# Patient Record
Sex: MALE | Race: WHITE | NOT HISPANIC OR LATINO | Employment: UNEMPLOYED | URBAN - METROPOLITAN AREA
[De-identification: names, ages, dates, MRNs, and addresses within clinical notes are randomized per-mention and may not be internally consistent; named-entity substitution may affect disease eponyms.]

---

## 2017-01-13 ENCOUNTER — OFFICE VISIT (OUTPATIENT)
Dept: PULMONOLOGY | Facility: CLINIC | Age: 57
End: 2017-01-13
Payer: MEDICAID

## 2017-01-13 VITALS
HEART RATE: 137 BPM | SYSTOLIC BLOOD PRESSURE: 135 MMHG | BODY MASS INDEX: 21.9 KG/M2 | DIASTOLIC BLOOD PRESSURE: 85 MMHG | OXYGEN SATURATION: 93 % | HEIGHT: 66 IN | WEIGHT: 136.25 LBS

## 2017-01-13 DIAGNOSIS — J44.1 COPD EXACERBATION: Primary | ICD-10-CM

## 2017-01-13 DIAGNOSIS — S29.9XXD CHEST WALL INJURY, SUBSEQUENT ENCOUNTER: ICD-10-CM

## 2017-01-13 PROBLEM — S29.9XXA CHEST WALL INJURY: Status: ACTIVE | Noted: 2017-01-13

## 2017-01-13 PROCEDURE — 99213 OFFICE O/P EST LOW 20 MIN: CPT | Mod: S$PBB,,, | Performed by: INTERNAL MEDICINE

## 2017-01-13 PROCEDURE — 99214 OFFICE O/P EST MOD 30 MIN: CPT | Mod: PBBFAC,PO | Performed by: INTERNAL MEDICINE

## 2017-01-13 PROCEDURE — 99999 PR PBB SHADOW E&M-EST. PATIENT-LVL IV: CPT | Mod: PBBFAC,,, | Performed by: INTERNAL MEDICINE

## 2017-01-13 RX ORDER — AMOXICILLIN AND CLAVULANATE POTASSIUM 875; 125 MG/1; MG/1
TABLET, FILM COATED ORAL
Refills: 3 | COMMUNITY
Start: 2016-12-30

## 2017-01-13 RX ORDER — ATORVASTATIN CALCIUM 40 MG/1
TABLET, FILM COATED ORAL
Refills: 6 | COMMUNITY
Start: 2016-12-03

## 2017-01-13 RX ORDER — HYDROCODONE BITARTRATE AND ACETAMINOPHEN 10; 325 MG/1; MG/1
TABLET ORAL
Refills: 0 | COMMUNITY
Start: 2016-12-12

## 2017-01-13 RX ORDER — ESCITALOPRAM OXALATE 20 MG/1
TABLET ORAL
Refills: 6 | COMMUNITY
Start: 2016-12-07

## 2017-01-13 NOTE — PATIENT INSTRUCTIONS
Really needs to be involved with pain management the way narcotics are regulated/monitored- for pain medications.    Lung are very severe.  Prednisone will worsen bones and expect may get bones fractures with minimal or no trauma.     2 view chest xray should be done.

## 2017-01-13 NOTE — MR AVS SNAPSHOT
[facility]  [address]  [place] [address]  Phone: [phone]                  Jair Cohen   2017 1:00 PM   Office Visit    Description:  Male : 1960   Provider:  Alex Bailey MD   Department:  [facility]           Reason for Visit     Cough     Shortness of Breath     Wheezing     COPD           Diagnoses this Visit        Comments    COPD exacerbation    -  Primary     Chest wall injury, subsequent encounter                To Do List           Future Appointments        Provider Department Dept Phone    2017 11:00 AM MD [facility] [phone]      Goals (5 Years of Data)     None      Follow-Up and Disposition     Return in about 3 months (around 2017), or if symptoms worsen or fail to improve.    Follow-up and Disposition History      [facility]     [facility] -  Assistance  Registered nurses in the [facility] provide clinical advisement, health education, appointment booking, and other advisory services.  Call for this free service at [phone].             Medications           Message regarding Medications     Verify the changes and/or additions to your medication regime listed below are the same as discussed with your clinician today.  If any of these changes or additions are incorrect, please notify your healthcare provider.        STOP taking these medications     alendronate (FOSAMAX) 70 MG tablet TAKE 1 TABLET BY MOUTH EVERY WEEK    azithromycin (Z-CARLOS MANUEL) 250 MG tablet     azithromycin (ZITHROMAX) 500 MG tablet     buprenorphine-naloxone (SUBOXONE) 8-2 mg Film 1 film SL BID    duloxetine (CYMBALTA) 30 MG capsule     duloxetine (CYMBALTA) 60 MG capsule            Verify that the below list of medications is an accurate representation of the medications you are currently taking.  If none reported, the list may be blank. If incorrect, please contact your  "healthcare provider. Carry this list with you in case of emergency.           Current Medications     ADVAIR DISKUS 500-50 mcg/dose DsDv diskus inhaler     albuterol-ipratropium 2.5mg-0.5mg/3mL (DUO-NEB) 0.5 mg-3 mg(2.5 mg base)/3 mL nebulizer solution     amoxicillin-clavulanate 875-125mg (AUGMENTIN) 875-125 mg per tablet TK 1 T PO  BID WC    atorvastatin (LIPITOR) 40 MG tablet TK 1 T PO QD    escitalopram oxalate (LEXAPRO) 20 MG tablet TK 1 T PO QD    fluticasone (FLONASE) 50 mcg/actuation nasal spray     gabapentin (NEURONTIN) 300 MG capsule     hydrocodone-acetaminophen 10-325mg (NORCO)  mg Tab TK 1 T PO TID PRN P    levofloxacin (LEVAQUIN) 500 MG tablet     methylPREDNISolone (MEDROL DOSEPACK) 4 mg tablet     montelukast (SINGULAIR) 10 mg tablet     predniSONE (DELTASONE) 20 MG tablet     PROAIR HFA 90 mcg/actuation inhaler     TUDORZA PRESSAIR 400 mcg/actuation AePB     VITAMIN D2 50,000 unit capsule            Clinical Reference Information           Vital Signs - Last Recorded  Most recent update: 1/13/2017  1:28 PM by Kody Edward MA    BP Pulse Ht Wt SpO2 BMI    135/85 (BP Location: Left arm, Patient Position: Sitting, BP Method: Automatic) (!) 137 5' 6" (1.676 m) 61.8 kg (136 lb 3.9 oz) (!) 93% 21.99 kg/m2      Blood Pressure          Most Recent Value    BP  135/85      Allergies as of 1/13/2017     No Known Allergies      Immunizations Administered on Date of Encounter - 1/13/2017     None      Orders Placed During Today's Visit     Future Labs/Procedures Expected by Expires    X-Ray Chest PA And Lateral  1/13/2017 1/13/2018      Instructions    Really needs to be involved with pain management the way narcotics are regulated/monitored- for pain medications.    Lung are very severe.  Prednisone will worsen bones and expect may get bones fractures with minimal or no trauma.     2 view chest xray should be done.           Smoking Cessation     If you would like to quit smoking:   You may be " eligible for free services if you are a [place] resident and started smoking cigarettes before September 1, 1988.  Call the [employer] ([employer]) toll free at (544) [phone] or [phone] [phone].   Call 4-437-QUIT-NOW if you do not meet the above criteria.

## 2017-01-13 NOTE — PROGRESS NOTES
"1/13/2017    [facility]  Office Note    Chief Complaint   Patient presents with    Cough    Shortness of Breath    Wheezing    COPD       HPI: pt seen in [facility].  Had recent rib injury and after fall down stairs  Couple weeks ago, went to er last pm.  Rib xray and arm xray and given amoxil and predisone.  Pain 10/10 and cannot walk.  Pain started 2 days after fall.  Has constant cough.     Sees dr wong for pain meds, was on suboxone.  Get hydrocodone 10 - 4 daily from dr wong.      Mucous clear, neb rx through out day.  S/o 2 weeks abx     Has portable 02 concetnrator. In wheelchair due to rib pain.      The chief compliant  problem is varies with instablilty at time   PFSH:  Past Medical History   Diagnosis Date    Asthma     COPD (chronic obstructive pulmonary disease)     Hypertension          History reviewed. No pertinent past surgical history.  Social History   Substance Use Topics    Smoking status: Current Every Day Smoker     Types: Cigarettes    Smokeless tobacco: None    Alcohol use No     Family History   Problem Relation Age of Onset    Cancer Mother     Heart disease Father     Cancer Father      Review of patient's allergies indicates:  No Known Allergies    Performance Status:The patient's activity level is housebound activities.      Review of Systems:  a review of eleven systems covering constitutional, Eye, HEENT, Psych, Respiratory, Cardiac, GI, , Musculoskeletal, Endocrine, Dermatologic was negative except for pertinent findings as listed ABOVE and below: wt down a little for 140 to 136.  Smokes ppd and uses neb rx continuous daily.       Exam:Comprehensive exam done.   Visit Vitals    /85 (BP Location: Left arm, Patient Position: Sitting, BP Method: Automatic)    Pulse (!) 137    Ht 5' 6" (1.676 m)    Wt 61.8 kg (136 lb 3.9 oz)    SpO2 (!) 93%    BMI 21.99 kg/m2     Exam included Vitals as listed, and patient's appearance and affect and alertness and mood, oral " exam for yeast and hygiene and pharynx lesions and Mallapatti (M) score, neck with inspection for jvd and masses and thyroid abnormalities and lymph nodes (supraclavicular and infraclavicular nodes and axillary also examined and noted if abn), chest exam included symmetry and effort and fremitus and percussion and auscultation, cardiac exam included rhythm and gallops and murmur and rubs and jvd and edema, abdominal exam for mass and hepatosplenomegaly and tenderness and hernias and bowel sounds, Musculoskeletal exam with muscle tone and posture and mobility/gait and  strength, and skin for rashes and cyanosis and pallor and turgor, extremity for clubbing.  Findings were normal except for pertinent findings listed below:  M2 and good bs, tender left chest no edema, looks frail.    Radiographs (ct chest and cxr) reviewed: was not done      Labs was done   and result was     At [facility]    PFT was not done     Plan:  Clinical impression is resonably certain and repeated evaluation prn +/- follow up will be needed as below.    There are no diagnoses linked to this encounter.    No Follow-up on file.    Discussed with patient above for education the following:       Really needs to be involved with pain management the way narcotics are regulated/monitored- for pain medications.    Lung are very severe.  Prednisone will worsen bones and expect may get bones fractures with minimal or no trauma.     2 view chest xray should be done.